# Patient Record
Sex: FEMALE | Race: WHITE | NOT HISPANIC OR LATINO | Employment: FULL TIME | URBAN - METROPOLITAN AREA
[De-identification: names, ages, dates, MRNs, and addresses within clinical notes are randomized per-mention and may not be internally consistent; named-entity substitution may affect disease eponyms.]

---

## 2017-06-16 ENCOUNTER — TRANSCRIBE ORDERS (OUTPATIENT)
Dept: MAMMOGRAPHY | Facility: CLINIC | Age: 47
End: 2017-06-16

## 2017-06-16 ENCOUNTER — ALLSCRIPTS OFFICE VISIT (OUTPATIENT)
Dept: OTHER | Facility: OTHER | Age: 47
End: 2017-06-16

## 2017-06-16 ENCOUNTER — HOSPITAL ENCOUNTER (OUTPATIENT)
Dept: MAMMOGRAPHY | Facility: CLINIC | Age: 47
Discharge: HOME | End: 2017-06-16
Payer: COMMERCIAL

## 2017-06-16 DIAGNOSIS — Z12.31 ENCOUNTER FOR SCREENING MAMMOGRAM FOR MALIGNANT NEOPLASM OF BREAST: ICD-10-CM

## 2017-06-16 DIAGNOSIS — Z12.31 ENCOUNTER FOR SCREENING MAMMOGRAM FOR MALIGNANT NEOPLASM OF BREAST: Primary | ICD-10-CM

## 2017-06-16 PROCEDURE — 77063 BREAST TOMOSYNTHESIS BI: CPT

## 2017-06-16 PROCEDURE — G0202 SCR MAMMO BI INCL CAD: HCPCS

## 2017-06-20 LAB
A/G RATIO (HISTORICAL): 2 (ref 1.2–2.2)
ALBUMIN SERPL BCP-MCNC: 4.3 G/DL (ref 3.5–5.5)
ALP SERPL-CCNC: 53 IU/L (ref 39–117)
ALT SERPL W P-5'-P-CCNC: 14 IU/L (ref 0–32)
AST SERPL W P-5'-P-CCNC: 20 IU/L (ref 0–40)
BASOPHILS # BLD AUTO: 0.1 X10E3/UL (ref 0–0.2)
BASOPHILS # BLD AUTO: 1 %
BILIRUB SERPL-MCNC: 0.4 MG/DL (ref 0–1.2)
BUN SERPL-MCNC: 7 MG/DL (ref 6–24)
BUN/CREA RATIO (HISTORICAL): 8 (ref 9–23)
CALCIUM SERPL-MCNC: 9.3 MG/DL (ref 8.7–10.2)
CHLORIDE SERPL-SCNC: 97 MMOL/L (ref 96–106)
CO2 SERPL-SCNC: 23 MMOL/L (ref 18–29)
CREAT SERPL-MCNC: 0.87 MG/DL (ref 0.57–1)
DEPRECATED RDW RBC AUTO: 12.6 % (ref 12.3–15.4)
EGFR AFRICAN AMERICAN (HISTORICAL): 92 ML/MIN/1.73
EGFR-AMERICAN CALC (HISTORICAL): 80 ML/MIN/1.73
EOSINOPHIL # BLD AUTO: 0.1 X10E3/UL (ref 0–0.4)
EOSINOPHIL # BLD AUTO: 2 %
GLUCOSE SERPL-MCNC: 61 MG/DL (ref 65–99)
HCT VFR BLD AUTO: 39.4 % (ref 34–46.6)
HGB BLD-MCNC: 13.2 G/DL (ref 11.1–15.9)
IMM.GRANULOCYTES (CD4/8) (HISTORICAL): 0 %
IMM.GRANULOCYTES (CD4/8) (HISTORICAL): 0 X10E3/UL (ref 0–0.1)
LYMPHOCYTES # BLD AUTO: 1.8 X10E3/UL (ref 0.7–3.1)
LYMPHOCYTES # BLD AUTO: 37 %
MCH RBC QN AUTO: 32.6 PG (ref 26.6–33)
MCHC RBC AUTO-ENTMCNC: 33.5 G/DL (ref 31.5–35.7)
MCV RBC AUTO: 97 FL (ref 79–97)
MONOCYTES # BLD AUTO: 0.5 X10E3/UL (ref 0.1–0.9)
MONOCYTES (HISTORICAL): 10 %
NEUTROPHILS # BLD AUTO: 2.5 X10E3/UL (ref 1.4–7)
NEUTROPHILS # BLD AUTO: 50 %
PLATELET # BLD AUTO: 251 X10E3/UL (ref 150–379)
POTASSIUM SERPL-SCNC: 4.6 MMOL/L (ref 3.5–5.2)
RBC (HISTORICAL): 4.05 X10E6/UL (ref 3.77–5.28)
SODIUM SERPL-SCNC: 138 MMOL/L (ref 134–144)
TOT. GLOBULIN, SERUM (HISTORICAL): 2.2 G/DL (ref 1.5–4.5)
TOTAL PROTEIN (HISTORICAL): 6.5 G/DL (ref 6–8.5)
TSH SERPL DL<=0.05 MIU/L-ACNC: 1.71 UIU/ML (ref 0.45–4.5)
WBC # BLD AUTO: 4.9 X10E3/UL (ref 3.4–10.8)

## 2018-01-13 VITALS
WEIGHT: 157.44 LBS | HEIGHT: 66 IN | RESPIRATION RATE: 16 BRPM | BODY MASS INDEX: 25.3 KG/M2 | SYSTOLIC BLOOD PRESSURE: 128 MMHG | DIASTOLIC BLOOD PRESSURE: 72 MMHG | HEART RATE: 80 BPM

## 2018-02-11 DIAGNOSIS — G43.909 MIGRAINE WITHOUT STATUS MIGRAINOSUS, NOT INTRACTABLE, UNSPECIFIED MIGRAINE TYPE: Primary | ICD-10-CM

## 2018-02-13 RX ORDER — TRAMADOL HYDROCHLORIDE 50 MG/1
TABLET ORAL
Qty: 45 TABLET | Refills: 5 | Status: SHIPPED | OUTPATIENT
Start: 2018-02-13

## 2019-02-04 ENCOUNTER — OFFICE VISIT (OUTPATIENT)
Dept: FAMILY MEDICINE CLINIC | Facility: CLINIC | Age: 49
End: 2019-02-04
Payer: COMMERCIAL

## 2019-02-04 VITALS
RESPIRATION RATE: 12 BRPM | BODY MASS INDEX: 22.49 KG/M2 | DIASTOLIC BLOOD PRESSURE: 80 MMHG | WEIGHT: 135 LBS | SYSTOLIC BLOOD PRESSURE: 120 MMHG | HEIGHT: 65 IN | HEART RATE: 72 BPM

## 2019-02-04 DIAGNOSIS — Z00.00 PE (PHYSICAL EXAM), ANNUAL: Primary | ICD-10-CM

## 2019-02-04 DIAGNOSIS — Z12.39 SCREENING FOR MALIGNANT NEOPLASM OF BREAST: ICD-10-CM

## 2019-02-04 DIAGNOSIS — Z23 NEED FOR TETANUS BOOSTER: ICD-10-CM

## 2019-02-04 PROBLEM — F90.0 ATTENTION DEFICIT HYPERACTIVITY DISORDER (ADHD), PREDOMINANTLY INATTENTIVE TYPE: Status: ACTIVE | Noted: 2018-04-12

## 2019-02-04 PROBLEM — F43.10 PTSD (POST-TRAUMATIC STRESS DISORDER): Status: ACTIVE | Noted: 2017-11-08

## 2019-02-04 PROBLEM — F32.1 DEPRESSION, MAJOR, SINGLE EPISODE, MODERATE (HCC): Status: ACTIVE | Noted: 2017-11-08

## 2019-02-04 PROBLEM — M48.02 CERVICAL STENOSIS OF SPINE: Status: ACTIVE | Noted: 2018-04-12

## 2019-02-04 PROCEDURE — 90471 IMMUNIZATION ADMIN: CPT

## 2019-02-04 PROCEDURE — 99396 PREV VISIT EST AGE 40-64: CPT | Performed by: NURSE PRACTITIONER

## 2019-02-04 PROCEDURE — 90715 TDAP VACCINE 7 YRS/> IM: CPT

## 2019-02-04 RX ORDER — ALPRAZOLAM 0.5 MG/1
0.5 TABLET ORAL AS NEEDED
COMMUNITY

## 2019-02-04 NOTE — PROGRESS NOTES
Gerson English is a 50 y o   female and is here for routine health maintenance  The patient reports no problems  Cancer Screening  Colononoscopy no yet due  Mammogram will get today   Pap is scheduled   Abnormal pap? no  Smoker none        Immunization History   Administered Date(s) Administered    Td (adult), adsorbed 10/01/2004       Dentist Visit  within 6-12 months        The following portions of the patient's history were reviewed and updated as appropriate: allergies, current medications, past family history, past medical history, past social history, past surgical history and problem list       Review of Systems  Review of Systems      Objective   Vitals:    02/04/19 1510   BP: 120/80   Pulse: 72   Resp: 12     Wt Readings from Last 3 Encounters:   02/04/19 61 2 kg (135 lb)   06/16/17 71 4 kg (157 lb 7 oz)   02/23/16 71 7 kg (158 lb)     BP Readings from Last 3 Encounters:   02/04/19 120/80   06/16/17 128/72   02/23/16 138/90     Pulse Readings from Last 3 Encounters:   02/04/19 72   06/16/17 80   02/23/16 76     BMI Readings from Last 3 Encounters:   02/04/19 22 47 kg/m²   06/16/17 25 80 kg/m²   02/23/16 26 29 kg/m²     Physical Exam    Assessment/Plan     Healthy female exam     1  PE: this as done today   2  Patient Counseling:  --Nutrition: Stressed importance of moderation in sodium/caffeine intake, saturated fat and cholesterol, caloric balance, sufficient intake of fresh fruits, vegetables, fiber, calcium, iron  --Exercise: Stressed the importance of regular exercise  --Injury prevention: Discussed safety belts, safety helmets, smoke detector, smoking near bedding or upholstery  --Dental health: Discussed importance of regular tooth brushing, flossing, and dental visits  --Immunizations reviewed  --Discussed benefits of screening colonoscopy  --After hours service discussed with patient    3  Cancer Screening UTD  4  Labs refuses labs  5  TDaP up dated today   6   Follow up in one year

## 2019-11-05 ENCOUNTER — OFFICE VISIT (OUTPATIENT)
Dept: FAMILY MEDICINE CLINIC | Facility: CLINIC | Age: 49
End: 2019-11-05
Payer: COMMERCIAL

## 2019-11-05 VITALS
WEIGHT: 136 LBS | RESPIRATION RATE: 12 BRPM | HEIGHT: 65 IN | HEART RATE: 80 BPM | BODY MASS INDEX: 22.66 KG/M2 | SYSTOLIC BLOOD PRESSURE: 118 MMHG | DIASTOLIC BLOOD PRESSURE: 70 MMHG

## 2019-11-05 DIAGNOSIS — F43.10 PTSD (POST-TRAUMATIC STRESS DISORDER): ICD-10-CM

## 2019-11-05 DIAGNOSIS — F32.1 DEPRESSION, MAJOR, SINGLE EPISODE, MODERATE (HCC): Primary | ICD-10-CM

## 2019-11-05 PROCEDURE — 99214 OFFICE O/P EST MOD 30 MIN: CPT | Performed by: NURSE PRACTITIONER

## 2019-11-05 PROCEDURE — 3008F BODY MASS INDEX DOCD: CPT | Performed by: NURSE PRACTITIONER

## 2019-11-05 RX ORDER — PRAZOSIN HYDROCHLORIDE 1 MG/1
1 CAPSULE ORAL
COMMUNITY
Start: 2019-09-18

## 2019-11-05 RX ORDER — DULOXETIN HYDROCHLORIDE 30 MG/1
30 CAPSULE, DELAYED RELEASE ORAL DAILY
COMMUNITY
Start: 2019-06-12

## 2019-11-05 RX ORDER — DEXTROAMPHETAMINE SACCHARATE, AMPHETAMINE ASPARTATE MONOHYDRATE, DEXTROAMPHETAMINE SULFATE AND AMPHETAMINE SULFATE 5; 5; 5; 5 MG/1; MG/1; MG/1; MG/1
20 CAPSULE, EXTENDED RELEASE ORAL
COMMUNITY
Start: 2019-10-21

## 2019-11-05 RX ORDER — TRAZODONE HYDROCHLORIDE 50 MG/1
50 TABLET ORAL DAILY PRN
COMMUNITY
Start: 2019-09-18

## 2019-11-05 NOTE — PROGRESS NOTES
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 and and has been under a lot of stress at work  Subsequently, she has taken a leave from her job at [employer] and is unsure if she will be returning there or not  Is in counseling for PTSD and trauma as a child and is seeing psych as well   Psych is managing her medications as well as writing her FMLA  Here today with request for recommnedation for GYN  Has gotten her mammo and we will will get that information  OBJECTIVE  Past Medical History:   Diagnosis Date    Eczema     Onset date: 86/60/0330     Lichen simplex chronicus     Onset date: 10/22/2008     Nervousness     Resolved 6/16/2017      temporarily  Wt Readings from Last 3 Encounters:   11/05/19 61 7 kg (136 lb)   02/04/19 61 2 kg (135 lb)   06/16/17 71 4 kg (157 lb 7 oz)     BP Readings from Last 3 Encounters:   11/05/19 118/70   02/04/19 120/80   06/16/17 128/72     Pulse Readings from Last 3 Encounters:   11/05/19 80   02/04/19 72   06/16/17 80     BMI Readings from Last 3 Encounters:   11/05/19 22 46 kg/m²   02/04/19 22 47 kg/m²   06/16/17 25 80 kg/m²        Physical Exam   Constitutional: She is oriented to person, place, and time  She appears well-developed and well-nourished  Neurological: She is alert and oriented to person, place, and time  Skin: Skin is warm and dry  Psychiatric: She has a normal mood and affect  Her behavior is normal  Judgment and thought content normal    Good insight and judgement  Good perspective and eye contact

## 2019-12-18 ENCOUNTER — VBI (OUTPATIENT)
Dept: FAMILY MEDICINE CLINIC | Facility: CLINIC | Age: 49
End: 2019-12-18

## 2019-12-18 NOTE — LETTER
Procedure Request Form: Mammogram      Date Requested: 19  Patient: Pedro Barahona  Patient : 1970   Referring Provider: Linda Harrison, CRNP        Date of Procedure ______________________________       The above patient has informed us that they have completed their   most recent Mammogram at your facility  Please complete   this form and attach all corresponding procedure reports/results  Comments __________________________________________________________  ____________________________________________________________________  ____________________________________________________________________  ____________________________________________________________________    Facility Completing Procedure _________________________________________    Form Completed By (print name) _______________________________________      Signature __________________________________________________________      These reports are needed for  compliance    Please fax this completed form and a copy of the procedure report to our office as soon as possible to [phone] rayshawn Puente: Phone [phone]    We thank you for your assistance in treating our mutual patient     (sent to [facility])

## 2019-12-18 NOTE — TELEPHONE ENCOUNTER
Pamela Brunner, MA  [facility]             Can you please update her mammo it was scanned into her chart on 12/14/2019 under encounters      Document is not mammogram report but a mammogram after visit letter from [address] [place] [address] [id number]  [phone] fax [phone]      12/18/19 10:08 AM     Upon review of the In Basket request we were able to locate and review the document  After careful review of the document we are unable to complete the request to update the patient chart because the document does not have required wording/result  *To review the status updates documented on this request, open sent messages within your In Basket and select this message to view comments  If comments do not automatically appear for review, select the properties button to review*  Thank you  Braulio Muhammad MA       Faxed to [address] [place] [address] 12/18/19 10:08 AM     12/18/19 10:08 AM     Upon review of the In Basket request and the patient's chart, initial outreach has been made to the external facility via fax to [facility] [phone]  A second outreach attempt will be made within 3 business days      Thank you  Braulio Muhammad MA       Received mammogram 7/22/2019 DOS, resulted Braulio Muhammad MA 12/24/19 8:55 AM

## 2021-05-06 ENCOUNTER — VBI (OUTPATIENT)
Dept: ADMINISTRATIVE | Facility: OTHER | Age: 51
End: 2021-05-06

## 2021-07-27 ENCOUNTER — VBI (OUTPATIENT)
Dept: ADMINISTRATIVE | Facility: OTHER | Age: 51
End: 2021-07-27

## 2021-08-24 ENCOUNTER — TELEPHONE (OUTPATIENT)
Dept: FAMILY MEDICINE CLINIC | Facility: CLINIC | Age: 51
End: 2021-08-24